# Patient Record
Sex: FEMALE | Race: WHITE | Employment: FULL TIME | URBAN - METROPOLITAN AREA
[De-identification: names, ages, dates, MRNs, and addresses within clinical notes are randomized per-mention and may not be internally consistent; named-entity substitution may affect disease eponyms.]

---

## 2018-01-22 ENCOUNTER — TELEPHONE (OUTPATIENT)
Dept: OBGYN CLINIC | Age: 53
End: 2018-01-22

## 2018-01-22 DIAGNOSIS — Z12.39 BREAST CANCER SCREENING: Primary | ICD-10-CM

## 2018-01-22 NOTE — TELEPHONE ENCOUNTER
Pt called to say that she is due for her screening mamm. Can we please fax order to [facility]?

## 2018-09-19 ENCOUNTER — OFFICE VISIT (OUTPATIENT)
Dept: OBGYN CLINIC | Age: 53
End: 2018-09-19
Payer: COMMERCIAL

## 2018-09-19 VITALS
DIASTOLIC BLOOD PRESSURE: 74 MMHG | SYSTOLIC BLOOD PRESSURE: 108 MMHG | HEIGHT: 71 IN | WEIGHT: 235 LBS | BODY MASS INDEX: 32.9 KG/M2

## 2018-09-19 DIAGNOSIS — Z00.00 WELL WOMAN EXAM WITHOUT GYNECOLOGICAL EXAM: Primary | ICD-10-CM

## 2018-09-19 DIAGNOSIS — Z12.39 BREAST CANCER SCREENING: ICD-10-CM

## 2018-09-19 PROCEDURE — 99396 PREV VISIT EST AGE 40-64: CPT | Performed by: NURSE PRACTITIONER

## 2018-09-19 NOTE — PATIENT INSTRUCTIONS
risk for heart attack and stroke. · Blood pressure. Have your blood pressure checked during a routine doctor visit. Your doctor will tell you how often to check your blood pressure based on your age, your blood pressure results, and other factors. · Mammogram. Ask your doctor how often you should have a mammogram, which is an X-ray of your breasts. A mammogram can spot breast cancer before it can be felt and when it is easiest to treat. · Pap test and pelvic exam. Ask your doctor how often you should have a Pap test. You may not need to have a Pap test as often as you used to. · Vision. Have your eyes checked every year or two or as often as your doctor suggests. Some experts recommend that you have yearly exams for glaucoma and other age-related eye problems starting at age 48. · Hearing. Tell your doctor if you notice any change in your hearing. You can have tests to find out how well you hear. · Diabetes. Ask your doctor whether you should have tests for diabetes. · Colon cancer. You should begin tests for colon cancer at age 48. You may have one of several tests. Your doctor will tell you how often to have tests based on your age and risk. Risks include whether you already had a precancerous polyp removed from your colon or whether your parents, sisters and brothers, or children have had colon cancer. · Thyroid disease. Talk to your doctor about whether to have your thyroid checked as part of a regular physical exam. Women have an increased chance of a thyroid problem. · Osteoporosis. You should begin tests for bone density at age 72. If you are younger than 72, ask your doctor whether you have factors that may increase your risk for this disease. You may want to have this test before age 72. · Heart attack and stroke risk. At least every 4 to 6 years, you should have your risk for heart attack and stroke assessed.  Your doctor uses factors such as your age, blood pressure, cholesterol, and whether you you can have a Pap plus an HPV test every 5 years. · If you had only a Pap test last time, as long as your results are normal, you can have a Pap test every 3 years. · If you had only an HPV test last time, as long as your results are normal, you can have an HPV test every 3 years. Women 72 and older  · If you are age 72 or older, talk with your doctor about what's right for you. Women ages 72 and older may no longer need to be screened for cervical cancer. When to stop having screening tests depends on your medical history, your overall health, and your risk of cervical cell changes or cervical cancer. What happens after the test?  The sample of cells taken during your test will be sent to a lab so that an expert can look at the cells. It usually takes a week or two to get the results back. Pap tests  · A normal result means that the test didn't find any abnormal cells in the sample. · An abnormal result can mean many things. Most of these aren't cancer. The results of your test may be abnormal because:  ¨ You have an infection of the vagina or cervix, such as a yeast infection. ¨ You have an IUD (intrauterine device for birth control). ¨ You have low estrogen levels after menopause that are causing the cells to change. ¨ You have cell changes that may be a sign of precancer or cancer. The results are ranked based on how serious the changes might be. HPV tests  · A normal result means that the test didn't find any high-risk HPV in the sample. · An abnormal HPV test doesn't mean that you have cervical cancer. It may mean that you are infected with one or more high-risk types of HPV. This increases your chance of having cell changes that may be a sign of precancer or cancer. Follow-up care is a key part of your treatment and safety. Be sure to make and go to all appointments, and call your doctor if you are having problems.  It's also a good idea to know your test results and keep a list of the medicines man.  · Do not smoke. Smoking can make bones thin faster. If you need help quitting, talk to your doctor about stop-smoking programs and medicines. These can increase your chances of quitting for good. When should you call for help? Watch closely for changes in your health, and be sure to contact your doctor if you have any problems. Where can you learn more? Go to https://[email or web address].[employer]. org and sign in to your [employer] account. Enter L159 in the [employer] box to learn more about \"Preventing Osteoporosis: Care Instructions. \"     If you do not have an account, please click on the \"Sign Up Now\" link. Current as of: May 12, 2017  Content Version: 11.7  © 1464-3101 [employer]. Care instructions adapted under license by [facility] ([facility]). If you have questions about a medical condition or this instruction, always ask your healthcare professional. [address] any warranty or liability for your use of this information.

## 2018-09-19 NOTE — PROGRESS NOTES
The patient was asked if she would like a chaperone present for her intimate exam. She  Declined the chaperone.  Mariela Elza  Patient declines student to be present at appointment

## 2018-09-19 NOTE — PROGRESS NOTES
SUBJECTIVE: 45 y.o. [id number] female here for well woman exam. Pt is postmenopausal with no menses since endometrial ablation and no complaints today. Pt reports menopause s/s as none. Pt denies smoking and drinks ETOH never. Pt reports regular exercise and is doing [employer],  and does not take a multivitamin. Pt reports she has had a recent colonoscopy and mammogram will be due 2/2019. Review of Systems:   General ROS: negative   Psychological ROS: negative   EENT ROS: negative   Endocrine ROS: negative   Respiratory ROS: no cough, shortness of breath, or wheezing   Cardiovascular ROS: no chest pain or dyspnea on exertion   Gastrointestinal ROS: no abdominal pain, change in bowel habits, or black or bloody stools   Genito-Urinary ROS: no dysuria, trouble voiding, or hematuria   Musculoskeletal ROS: negative   Neurological ROS: no TIA or stroke symptoms   Dermatological ROS: negative     OBJECTIVE: Please see chart for additional documentation   /74   Ht 5' 11\" (1.803 m)   Wt 235 lb (106.6 kg)   Breastfeeding? No   BMI 32.78 kg/m²      Pt's medical and surgical history reviewed    Physical Exam:  GENERAL:  She appears well, afebrile mildly obese      NEUROLOGIC: Alert and oriented to person, place and time  NECK: no thyroidmegaly  BREASTS: Bilateral breasts without masses, skin changes or nipple discharge   LUNGS: Clear to ascultation bilaterally  CVS: regular rate and rhythm  LYMPHATIC: No palpable lymph nodes  ABDOMEN: soft, benign, difficult to assess due to body habitus, nontender. SKIN: atrophic changes noted, warm and dry, normal texture and tone, no lesions    Pelvic Exam:   Pt declined exam    ASSESSMENT:   Postmenopause  Well woman without routine gynecologic exam  Breast cancer screening    PLAN:   Pt counseled on osteoporosis prevention and information given. Advised calcium intake of 1200 mg (diet and supplement) and Vitamin D 400-800 IU daily.    Last pap 12/2016 - Normal pap, HPV neg, according to ASCCP and ACOG guidelines repeat pap 3 years. Reinforced self breast exam, mammogram ordered  Encouraged healthy lifestyle and moderated exercise (30 mins walking daily). Pt verbalized understanding. Routine health screenings and precautions discussed. RTC 12 months for well woman exam or visits PRN. Approximately 30 minutes total face to face time was spent with pt and more than half the time was spent counseling/pt education, and/or coordinating care. Pt verbalized understanding and stated questions were answered to her satisfaction.

## 2023-11-01 PROBLEM — F52.0 HYPOACTIVE SEXUAL DESIRE DISORDER: Status: ACTIVE | Noted: 2023-11-01

## 2023-11-01 PROBLEM — E66.9 OBESITY: Status: ACTIVE | Noted: 2023-11-01

## 2023-11-01 PROBLEM — Z78.9 NEVER SMOKED ANY SUBSTANCE: Status: ACTIVE | Noted: 2023-11-01

## 2023-11-01 PROBLEM — R09.81 NASAL CONGESTION: Status: ACTIVE | Noted: 2023-11-01

## 2023-11-01 PROBLEM — R00.0 TACHYCARDIA: Status: ACTIVE | Noted: 2023-11-01

## 2023-11-01 PROBLEM — I10 HTN (HYPERTENSION): Status: ACTIVE | Noted: 2023-11-01

## 2023-11-01 PROBLEM — R06.83 SNORING: Status: ACTIVE | Noted: 2023-11-01

## 2023-11-01 PROBLEM — I78.1 NEVUS, NON-NEOPLASTIC: Status: ACTIVE | Noted: 2023-06-20

## 2023-11-01 PROBLEM — L70.9 ACNE: Status: ACTIVE | Noted: 2023-11-01

## 2023-11-01 PROBLEM — N39.3 OVERFLOW STRESS INCONTINENCE OF URINE IN FEMALE: Status: ACTIVE | Noted: 2023-11-01

## 2023-11-01 PROBLEM — E66.811 CLASS 1 OBESITY WITH BODY MASS INDEX (BMI) OF 32.0 TO 32.9 IN ADULT: Status: ACTIVE | Noted: 2023-11-01

## 2023-11-01 PROBLEM — M25.512 CHRONIC LEFT SHOULDER PAIN: Status: ACTIVE | Noted: 2023-11-01

## 2023-11-01 PROBLEM — G89.29 CHRONIC LEFT SHOULDER PAIN: Status: ACTIVE | Noted: 2023-11-01

## 2023-11-01 PROBLEM — J45.909 ASTHMA (HHS-HCC): Status: ACTIVE | Noted: 2023-11-01

## 2023-11-01 PROBLEM — E78.5 HYPERLIPIDEMIA: Status: ACTIVE | Noted: 2023-11-01

## 2023-11-01 PROBLEM — F41.9 ANXIETY DISORDER: Status: ACTIVE | Noted: 2023-11-01

## 2023-11-01 PROBLEM — E78.2 MIXED HYPERLIPIDEMIA: Status: ACTIVE | Noted: 2023-11-01

## 2023-11-01 PROBLEM — R40.0 DAYTIME SLEEPINESS: Status: ACTIVE | Noted: 2023-11-01

## 2023-11-01 PROBLEM — E66.9 CLASS 1 OBESITY WITH BODY MASS INDEX (BMI) OF 32.0 TO 32.9 IN ADULT: Status: ACTIVE | Noted: 2023-11-01

## 2023-11-01 PROBLEM — G47.33 OBSTRUCTIVE SLEEP APNEA: Status: ACTIVE | Noted: 2023-11-01

## 2023-11-01 PROBLEM — D50.9 IRON DEFICIENCY ANEMIA: Status: ACTIVE | Noted: 2023-11-01

## 2023-11-01 PROBLEM — E55.9 VITAMIN D DEFICIENCY: Status: ACTIVE | Noted: 2023-11-01

## 2023-11-01 PROBLEM — N39.490 OVERFLOW STRESS INCONTINENCE OF URINE IN FEMALE: Status: ACTIVE | Noted: 2023-11-01

## 2023-11-01 PROBLEM — N39.3 STRESS INCONTINENCE (FEMALE) (MALE): Status: ACTIVE | Noted: 2023-11-01

## 2023-11-01 PROBLEM — E03.9 HYPOTHYROIDISM: Status: ACTIVE | Noted: 2023-11-01

## 2023-11-01 RX ORDER — VALACYCLOVIR HYDROCHLORIDE 1 G/1
2 TABLET, FILM COATED ORAL
COMMUNITY
Start: 2023-06-20

## 2023-11-01 RX ORDER — SERTRALINE HYDROCHLORIDE 50 MG/1
3 TABLET, FILM COATED ORAL DAILY
COMMUNITY
Start: 2014-03-06

## 2023-11-01 RX ORDER — ROSUVASTATIN CALCIUM 10 MG/1
0.5 TABLET, COATED ORAL EVERY EVENING
COMMUNITY
Start: 2012-08-27 | End: 2023-11-30 | Stop reason: SDUPTHER

## 2023-11-01 RX ORDER — ROSUVASTATIN CALCIUM 5 MG/1
1 TABLET, COATED ORAL DAILY
COMMUNITY
Start: 2014-03-06 | End: 2023-11-30 | Stop reason: ALTCHOICE

## 2023-11-01 RX ORDER — ALBUTEROL SULFATE 90 UG/1
1-2 AEROSOL, METERED RESPIRATORY (INHALATION)
COMMUNITY
Start: 2018-03-15

## 2023-11-01 RX ORDER — ASPIRIN 81 MG/1
1 TABLET ORAL DAILY
COMMUNITY
End: 2023-11-30 | Stop reason: ALTCHOICE

## 2023-11-01 RX ORDER — LISINOPRIL 20 MG/1
10 TABLET ORAL DAILY
COMMUNITY
Start: 2022-06-27 | End: 2023-11-30 | Stop reason: SDUPTHER

## 2023-11-01 RX ORDER — MONTELUKAST SODIUM 10 MG/1
1 TABLET ORAL EVERY EVENING
COMMUNITY
Start: 2013-02-14

## 2023-11-01 RX ORDER — METOPROLOL SUCCINATE 50 MG/1
0.5 TABLET, EXTENDED RELEASE ORAL DAILY
COMMUNITY
End: 2023-11-07

## 2023-11-01 RX ORDER — PENCICLOVIR 10 MG/G
CREAM TOPICAL
COMMUNITY
Start: 2014-05-20

## 2023-11-01 RX ORDER — LEVOTHYROXINE SODIUM 200 UG/1
1 TABLET ORAL
COMMUNITY
Start: 2012-06-22

## 2023-11-02 ENCOUNTER — APPOINTMENT (OUTPATIENT)
Dept: CARDIOLOGY | Facility: CLINIC | Age: 58
End: 2023-11-02
Payer: COMMERCIAL

## 2023-11-05 DIAGNOSIS — I10 ESSENTIAL (PRIMARY) HYPERTENSION: ICD-10-CM

## 2023-11-07 RX ORDER — METOPROLOL SUCCINATE 50 MG/1
TABLET, EXTENDED RELEASE ORAL DAILY
Qty: 45 TABLET | Refills: 3 | Status: SHIPPED | OUTPATIENT
Start: 2023-11-07 | End: 2023-11-30 | Stop reason: SDUPTHER

## 2023-11-30 ENCOUNTER — OFFICE VISIT (OUTPATIENT)
Dept: CARDIOLOGY | Facility: CLINIC | Age: 58
End: 2023-11-30
Payer: COMMERCIAL

## 2023-11-30 VITALS — DIASTOLIC BLOOD PRESSURE: 88 MMHG | HEART RATE: 76 BPM | SYSTOLIC BLOOD PRESSURE: 140 MMHG

## 2023-11-30 DIAGNOSIS — G47.33 OBSTRUCTIVE SLEEP APNEA: ICD-10-CM

## 2023-11-30 DIAGNOSIS — Z78.9 NEVER SMOKED ANY SUBSTANCE: ICD-10-CM

## 2023-11-30 DIAGNOSIS — I10 ESSENTIAL (PRIMARY) HYPERTENSION: ICD-10-CM

## 2023-11-30 DIAGNOSIS — Z86.39 H/O GRAVES' DISEASE: ICD-10-CM

## 2023-11-30 DIAGNOSIS — E78.2 MIXED HYPERLIPIDEMIA: ICD-10-CM

## 2023-11-30 DIAGNOSIS — R00.0 TACHYCARDIA: ICD-10-CM

## 2023-11-30 DIAGNOSIS — I10 PRIMARY HYPERTENSION: ICD-10-CM

## 2023-11-30 PROBLEM — K82.4 GALLBLADDER POLYP: Status: ACTIVE | Noted: 2020-01-02

## 2023-11-30 PROCEDURE — 3077F SYST BP >= 140 MM HG: CPT | Performed by: INTERNAL MEDICINE

## 2023-11-30 PROCEDURE — 3079F DIAST BP 80-89 MM HG: CPT | Performed by: INTERNAL MEDICINE

## 2023-11-30 PROCEDURE — 1036F TOBACCO NON-USER: CPT | Performed by: INTERNAL MEDICINE

## 2023-11-30 PROCEDURE — 99214 OFFICE O/P EST MOD 30 MIN: CPT | Performed by: INTERNAL MEDICINE

## 2023-11-30 RX ORDER — METOPROLOL SUCCINATE 50 MG/1
50 TABLET, EXTENDED RELEASE ORAL DAILY
Qty: 90 TABLET | Refills: 3 | Status: SHIPPED | OUTPATIENT
Start: 2023-11-30 | End: 2024-11-29

## 2023-11-30 RX ORDER — CYCLOBENZAPRINE HCL 10 MG
10 TABLET ORAL 3 TIMES DAILY PRN
COMMUNITY

## 2023-11-30 RX ORDER — ROSUVASTATIN CALCIUM 10 MG/1
5 TABLET, COATED ORAL EVERY EVENING
Qty: 45 TABLET | Refills: 3 | Status: SHIPPED | OUTPATIENT
Start: 2023-11-30 | End: 2023-12-05

## 2023-11-30 RX ORDER — LISINOPRIL 20 MG/1
10 TABLET ORAL DAILY
Qty: 45 TABLET | Refills: 3 | Status: SHIPPED | OUTPATIENT
Start: 2023-11-30 | End: 2024-11-29

## 2023-11-30 RX ORDER — IBUPROFEN 800 MG/1
800 TABLET ORAL EVERY 8 HOURS PRN
COMMUNITY

## 2023-11-30 NOTE — PROGRESS NOTES
follReferred by Dr. Sahu ref. provider found provider found for   Chief Complaint   Patient presents with    Follow-up     1 year        History of Present Illness  Adali Trujillo is a 58 y.o. year old female patient with remote history of supraventricular tachycardia but has been in sinus rhythm for a long time.  Blood pressure is adequately controlled and cholesterol is controlled.  She is known to have normal coronaries.  She is scheduled for hip surgery in February at the [facility].  Her EKG showed normal sinus rhythm with no acute changes.  I discussed with the patient in length that she will be a reasonable candidate for surgery and the fact she never had any history of coronary disease and she does not have arrhythmia at this point.  There is a chance she can go into atrial fibrillation or SVT postop but this can be managed easily.  She will follow-up with me after the surgery is done    Past Medical History  Past Medical History:   Diagnosis Date    Cervicalgia     Neck pain    Chronic sinusitis, unspecified     Sinusitis    Encounter for screening mammogram for malignant neoplasm of breast     Visit for screening mammogram    Other conditions influencing health status     Screening for malignant neoplasms, colon    Other conditions influencing health status     History of vaginal delivery    Palpitations     Palpitations    Personal history of diseases of the blood and blood-forming organs and certain disorders involving the immune mechanism     History of anemia    Personal history of other diseases of the digestive system     History of esophageal reflux    Personal history of other diseases of the respiratory system     History of pharyngitis    Personal history of other endocrine, nutritional and metabolic disease 08/22/2013    History of hypothyroidism    Personal history of other mental and behavioral disorders     History of anxiety disorder    Personal history of other specified conditions      History of insomnia    Personal history of other specified conditions     History of headache    Plantar fascial fibromatosis     Plantar fasciitis    Primary osteoarthritis, unspecified hand     Osteoarthritis, hand    Tachycardia, unspecified     Tachycardia    Vitamin D deficiency, unspecified 10/20/2015    Vitamin D deficiency    Vitamin D deficiency, unspecified 12/17/2013    Vitamin D deficiency       Social History  Social History     Tobacco Use    Smoking status: Never    Smokeless tobacco: Never   Substance Use Topics    Alcohol use: Not Currently    Drug use: Never       Family History     Family History   Problem Relation Name Age of Onset    Asthma Mother      Coronary artery disease Mother      Hyperlipidemia Mother      Hypertension Mother      Hyperthyroidism Mother      Other (thyroid disorder) Mother      Arthritis Father      Cancer Father      Prostate cancer Father      Other (urinary bladder cancer) Father      Other (stroke syndrome) Paternal Grandfather         Review of Systems  As per HPI, all other systems reviewed and negative.    Allergies:  Allergies   Allergen Reactions    Influenza Virus Vaccine Whole Unknown    Sulfa (Sulfonamide Antibiotics) Unknown        Outpatient Medications:  Current Outpatient Medications   Medication Instructions    albuterol (ProAir HFA) 90 mcg/actuation inhaler 1-2 puffs, inhalation,  EVERY 4 TO 6 HOURS AS NEEDED.<BR>    cyclobenzaprine (FLEXERIL) 10 mg, oral, 3 times daily PRN    ibuprofen 800 mg, oral, Every 8 hours PRN    levothyroxine (Synthroid, Levoxyl) 200 mcg tablet 1 tablet, oral, Daily before breakfast, <BR>    lisinopril 10 mg, oral, Daily    metoprolol succinate XL (Toprol-XL) 50 mg 24 hr tablet oral, Daily    montelukast (Singulair) 10 mg tablet 1 tablet, oral, Every evening    penciclovir (Denavir) 1 % cream USE AS DIRECTED.    rosuvastatin (Crestor) 10 mg tablet 0.5 tablets, oral, Every evening    sertraline (Zoloft) 50 mg tablet 3 tablets,  oral, Daily    valACYclovir (Valtrex) 1 gram tablet 2 tablets         Vitals:  Vitals:    11/30/23 1059   BP: 140/88   Pulse: 76       Physical Exam:  Physical Exam  Vitals and nursing note reviewed.   Constitutional:       Appearance: Normal appearance. She is normal weight.   HENT:      Head: Normocephalic and atraumatic.   Eyes:      Extraocular Movements: Extraocular movements intact.      Pupils: Pupils are equal, round, and reactive to light.   Cardiovascular:      Rate and Rhythm: Normal rate and regular rhythm.      Pulses: Normal pulses.   Pulmonary:      Effort: Pulmonary effort is normal.      Breath sounds: Normal breath sounds.   Musculoskeletal:      Cervical back: Normal range of motion.      Right lower leg: No edema.      Left lower leg: No edema.   Skin:     General: Skin is warm and dry.   Neurological:      General: No focal deficit present.      Mental Status: She is alert and oriented to person, place, and time.             Assessment/Plan   Diagnoses and all orders for this visit:  Primary hypertension  Mixed hyperlipidemia  Tachycardia  H/O Graves' disease  Obstructive sleep apnea  Never smoked any substance  Essential (primary) hypertension          Jay Tinajero MD [facility]  Interventional Cardiology   of [facility]     Thank you for allowing me to participate in the care of this patient. Please do not hesitate to contact me with any further questions or concerns.

## 2023-11-30 NOTE — PATIENT INSTRUCTIONS
Patient to follow up in 1 year with Dr. Jay Tinajero MD [facility]     In terms of hip procedure- have surgeon fax us clearance form once formally scheduled.   [phone].     No other changes today.   Continue same medications and treatments.   Patient educated on proper medication use.   Patient educated on risk factor modification.   Please bring any lab results from other providers / physicians to your next appointment.     Please bring all medicines, vitamins, and herbal supplements with you when you come to the office.     Prescriptions will not be filled unless you are compliant with your follow up appointments or have a follow up appointment scheduled as per instruction of your physician. Refills should be requested at the time of your visit.    IDonte RN am scribing for and in the presence of Dr. Jay Tinajero MD [facility]

## 2023-12-04 DIAGNOSIS — I10 PRIMARY HYPERTENSION: ICD-10-CM

## 2023-12-04 DIAGNOSIS — E78.2 MIXED HYPERLIPIDEMIA: ICD-10-CM

## 2023-12-05 RX ORDER — ROSUVASTATIN CALCIUM 10 MG/1
TABLET, FILM COATED ORAL
Qty: 45 TABLET | Refills: 3 | Status: SHIPPED | OUTPATIENT
Start: 2023-12-05 | End: 2023-12-06 | Stop reason: SDUPTHER

## 2023-12-06 RX ORDER — ROSUVASTATIN CALCIUM 10 MG/1
TABLET, COATED ORAL
Qty: 45 TABLET | Refills: 3 | Status: SHIPPED | OUTPATIENT
Start: 2023-12-06

## 2024-11-21 ENCOUNTER — APPOINTMENT (OUTPATIENT)
Dept: CARDIOLOGY | Facility: CLINIC | Age: 59
End: 2024-11-21
Payer: COMMERCIAL

## 2024-11-26 ENCOUNTER — APPOINTMENT (OUTPATIENT)
Dept: CARDIOLOGY | Facility: CLINIC | Age: 59
End: 2024-11-26
Payer: COMMERCIAL

## 2024-12-01 DIAGNOSIS — I10 ESSENTIAL (PRIMARY) HYPERTENSION: ICD-10-CM

## 2024-12-02 RX ORDER — METOPROLOL SUCCINATE 50 MG/1
50 TABLET, EXTENDED RELEASE ORAL DAILY
Qty: 90 TABLET | Refills: 3 | Status: SHIPPED | OUTPATIENT
Start: 2024-12-02

## 2024-12-02 NOTE — TELEPHONE ENCOUNTER
Received request for prescription refills for patient.   Patient follows with Dr. Tinajero    Request is for Toprol XL  Is patient currently on medication yes    Last OV 11/30/23  Next OV 1/2/25

## 2025-01-02 ENCOUNTER — APPOINTMENT (OUTPATIENT)
Dept: CARDIOLOGY | Facility: CLINIC | Age: 60
End: 2025-01-02
Payer: COMMERCIAL

## 2025-01-02 ENCOUNTER — TELEPHONE (OUTPATIENT)
Dept: CARDIOLOGY | Facility: CLINIC | Age: 60
End: 2025-01-02

## 2025-01-02 VITALS
DIASTOLIC BLOOD PRESSURE: 70 MMHG | SYSTOLIC BLOOD PRESSURE: 130 MMHG | HEART RATE: 68 BPM | HEIGHT: 71 IN | BODY MASS INDEX: 35.49 KG/M2 | WEIGHT: 253.5 LBS

## 2025-01-02 DIAGNOSIS — R00.2 PALPITATIONS: Primary | ICD-10-CM

## 2025-01-02 DIAGNOSIS — I10 PRIMARY HYPERTENSION: ICD-10-CM

## 2025-01-02 DIAGNOSIS — R00.0 TACHYCARDIA: ICD-10-CM

## 2025-01-02 DIAGNOSIS — I10 ESSENTIAL (PRIMARY) HYPERTENSION: ICD-10-CM

## 2025-01-02 DIAGNOSIS — G47.33 OBSTRUCTIVE SLEEP APNEA: ICD-10-CM

## 2025-01-02 DIAGNOSIS — Z78.9 NEVER SMOKED ANY SUBSTANCE: ICD-10-CM

## 2025-01-02 DIAGNOSIS — E78.2 MIXED HYPERLIPIDEMIA: ICD-10-CM

## 2025-01-02 PROCEDURE — 1036F TOBACCO NON-USER: CPT | Performed by: INTERNAL MEDICINE

## 2025-01-02 PROCEDURE — 3008F BODY MASS INDEX DOCD: CPT | Performed by: INTERNAL MEDICINE

## 2025-01-02 PROCEDURE — 99214 OFFICE O/P EST MOD 30 MIN: CPT | Performed by: INTERNAL MEDICINE

## 2025-01-02 PROCEDURE — 3075F SYST BP GE 130 - 139MM HG: CPT | Performed by: INTERNAL MEDICINE

## 2025-01-02 PROCEDURE — 3078F DIAST BP <80 MM HG: CPT | Performed by: INTERNAL MEDICINE

## 2025-01-02 RX ORDER — ROSUVASTATIN CALCIUM 10 MG/1
TABLET, COATED ORAL
Qty: 45 TABLET | Refills: 3 | Status: SHIPPED | OUTPATIENT
Start: 2025-01-02

## 2025-01-02 RX ORDER — AMOXICILLIN 500 MG/1
CAPSULE ORAL
COMMUNITY
Start: 2024-09-23

## 2025-01-02 RX ORDER — METOPROLOL SUCCINATE 50 MG/1
50 TABLET, EXTENDED RELEASE ORAL DAILY
Qty: 90 TABLET | Refills: 3 | Status: SHIPPED | OUTPATIENT
Start: 2025-01-02

## 2025-01-02 RX ORDER — LANOLIN ALCOHOL/MO/W.PET/CERES
400 CREAM (GRAM) TOPICAL 2 TIMES DAILY
Qty: 180 TABLET | Refills: 3 | Status: SHIPPED | OUTPATIENT
Start: 2025-01-02 | End: 2026-01-02

## 2025-01-02 RX ORDER — NAPROXEN SODIUM 220 MG/1
81 TABLET, FILM COATED ORAL 2 TIMES DAILY
COMMUNITY
Start: 2024-08-08

## 2025-01-02 NOTE — PATIENT INSTRUCTIONS
Continue same medications and treatments.   Patient educated on proper medication use.   Patient educated on risk factor modification.   Please bring any lab results from other providers / physicians to your next appointment.     Please bring all medicines, vitamins, and herbal supplements with you when you come to the office.     Prescriptions will not be filled unless you are compliant with your follow up appointments or have a follow up appointment scheduled as per instruction of your physician. Refills should be requested at the time of your visit.    CHECK YOUR BLOOD PRESSURE AT HOME    TAKE METOPROLOL SUCCINATE 50 MG DAILY (1 WHOLE TABLET)    START MAGNESIUM OXIDE 400 MG TWICE DAILY    STOP LISINOPRIL    FOLLOW UP IN 6 MONTHS    Thelma LUCREO LPN, am scribing for and in the presence of Dr. Jay Tinajero MD, FACC

## 2025-01-02 NOTE — PROGRESS NOTES
Referred by Dr. Sahu ref. provider found provider found for   Chief Complaint   Patient presents with    Follow-up     Overdue         History of Present Illness  Adali Trujillo is a 59 y.o. year old female patient here for cardiac evaluation.  She complained of the EKG because of palpitation.  Apparently she had Zio patch done at the [facility] told normal although I do not have the results of this point.  Any episode of syncope or presyncope.  Sinus rhythm and no acute changes.  The patient had will increase metoprolol to 50 mg p.o. daily.  Will obtain the results of the Zio patch and based on that we will determine if any further treatment is needed.  The patient understood.  Will follow-up as scheduled    Past Medical History  Past Medical History:   Diagnosis Date    Cervicalgia     Neck pain    Chronic sinusitis, unspecified     Sinusitis    Encounter for screening mammogram for malignant neoplasm of breast     Visit for screening mammogram    Other conditions influencing health status     Screening for malignant neoplasms, colon    Other conditions influencing health status     History of vaginal delivery    Palpitations     Palpitations    Personal history of diseases of the blood and blood-forming organs and certain disorders involving the immune mechanism     History of anemia    Personal history of other diseases of the digestive system     History of esophageal reflux    Personal history of other diseases of the respiratory system     History of pharyngitis    Personal history of other endocrine, nutritional and metabolic disease 08/22/2013    History of hypothyroidism    Personal history of other mental and behavioral disorders     History of anxiety disorder    Personal history of other specified conditions     History of insomnia    Personal history of other specified conditions     History of headache    Plantar fascial fibromatosis     Plantar fasciitis    Primary osteoarthritis, unspecified hand      Osteoarthritis, hand    Tachycardia, unspecified     Tachycardia    Vitamin D deficiency, unspecified 10/20/2015    Vitamin D deficiency    Vitamin D deficiency, unspecified 12/17/2013    Vitamin D deficiency       Social History  Social History     Tobacco Use    Smoking status: Never    Smokeless tobacco: Never   Substance Use Topics    Alcohol use: Not Currently    Drug use: Never       Family History     Family History   Problem Relation Name Age of Onset    Asthma Mother      Coronary artery disease Mother      Hyperlipidemia Mother      Hypertension Mother      Hyperthyroidism Mother      Other (thyroid disorder) Mother      Arthritis Father      Cancer Father      Prostate cancer Father      Other (urinary bladder cancer) Father      Other (stroke syndrome) Paternal Grandfather         Review of Systems  As per HPI, all other systems reviewed and negative.    Allergies:  Allergies   Allergen Reactions    Influenza Virus Vaccine Whole Unknown    Sulfa (Sulfonamide Antibiotics) Unknown        Outpatient Medications:  Current Outpatient Medications   Medication Instructions    albuterol (ProAir HFA) 90 mcg/actuation inhaler 1-2 puffs    amoxicillin (Amoxil) 500 mg capsule PRN    aspirin 81 mg, 2 times daily    cyclobenzaprine (FLEXERIL) 10 mg, 3 times daily PRN    ibuprofen 800 mg, Every 8 hours PRN    levothyroxine (Synthroid, Levoxyl) 200 mcg tablet 1 tablet, Daily before breakfast    lisinopril 10 mg, oral, Daily    metoprolol succinate XL (TOPROL-XL) 50 mg, oral, Daily    montelukast (Singulair) 10 mg tablet 1 tablet, Every evening    penciclovir (Denavir) 1 % cream USE AS DIRECTED.    rosuvastatin (Crestor) 10 mg tablet TAKE 0.5 TABLETS (5 MG) BY MOUTH ONCE DAILY IN THE EVENING.    sertraline (Zoloft) 50 mg tablet 3 tablets, Daily    valACYclovir (Valtrex) 1 gram tablet 2 tablets         Vitals:  Vitals:    01/02/25 0910   BP: 130/70   Pulse: 68       Physical Exam:  Physical Exam  Constitutional:        Appearance: Normal appearance.   HENT:      Head: Normocephalic.   Eyes:      Pupils: Pupils are equal, round, and reactive to light.   Cardiovascular:      Rate and Rhythm: Normal rate and regular rhythm.      Pulses: Normal pulses.      Heart sounds: Normal heart sounds.   Pulmonary:      Effort: Pulmonary effort is normal.      Breath sounds: Normal breath sounds.   Musculoskeletal:         General: Normal range of motion.   Skin:     General: Skin is warm and dry.   Neurological:      General: No focal deficit present.      Mental Status: She is alert and oriented to person, place, and time.           Assessment/Plan           Jay Tinajero MD [facility]  Interventional Cardiology   of [facility]     Thank you for allowing me to participate in the care of this patient. Please do not hesitate to contact me with any further questions or concerns.

## 2025-01-03 ENCOUNTER — TELEPHONE (OUTPATIENT)
Dept: CARDIOLOGY | Facility: CLINIC | Age: 60
End: 2025-01-03
Payer: COMMERCIAL

## 2025-01-06 ENCOUNTER — PATIENT MESSAGE (OUTPATIENT)
Dept: CARDIOLOGY | Facility: CLINIC | Age: 60
End: 2025-01-06
Payer: COMMERCIAL

## 2025-01-07 NOTE — TELEPHONE ENCOUNTER
Received Zio patch results from [facility]- placed for Dr. Jay Tinajero MD FACC review today in clinic.

## 2025-01-08 RX ORDER — METOPROLOL SUCCINATE 50 MG/1
75 TABLET, EXTENDED RELEASE ORAL DAILY
Qty: 135 TABLET | Refills: 3 | Status: SHIPPED | OUTPATIENT
Start: 2025-01-08 | End: 2026-01-08

## 2025-01-08 NOTE — TELEPHONE ENCOUNTER
Patient returned call back to this nurse.   Message discussed in detail- patient aware and agreeable to medication changes.     Med list updated for signing.   Secretaries- please call patient next week to schedule follow up in about 3 weeks please. She is in [place] and doesn't have her calendar with her at this time.

## 2025-01-08 NOTE — TELEPHONE ENCOUNTER
I do not believe Rosuvastatin is a new script for patient.   Dr. Jay Tinajero MD [facility] recently saw patient in clinic- and Rosuvastatin was refilled for another year which prompted the PA request.   I will discuss this with patient.     LM to return call. I have another encounter with additional concerns to discuss with patient as well. Please route back to this nurse.

## 2025-01-08 NOTE — TELEPHONE ENCOUNTER
Per Dr. Jay Tinajero MD [facility], patient monitor shows:  Predominant underlying rhythm was NSR, 1 run of Ventricular Tachycardia lasting 7 beats with max rate of 174bpm. 9 runs of SVT occurred; the run with the fastest interval lasting 7 beats with a max rate of 167bpm, the longest with 14 beats with an averate rate of 123bpm.     Per Dr. Jay Tinajero MD [facility]- patient to increase Metoprolol to 75mg once daily.   Follow up in 1 month to see how she is doing on higher dose of beta blockers and recently added Magnesium Oxide.

## 2025-01-08 NOTE — TELEPHONE ENCOUNTER
Patient returned call.   Discussed that Rosuvastatin is not new for her- she prefers brand Crestor.   No problems with affordability currently. Will disregard PA process- patient does not need this done. Thank you.

## 2025-01-08 NOTE — TELEPHONE ENCOUNTER
LM for patient to return my call. I do have a few messages to give patient- if she calls back, please route back to myself.     Will also send [facility] message as sometimes patient prefers that.